# Patient Record
Sex: FEMALE | HISPANIC OR LATINO | URBAN - METROPOLITAN AREA
[De-identification: names, ages, dates, MRNs, and addresses within clinical notes are randomized per-mention and may not be internally consistent; named-entity substitution may affect disease eponyms.]

---

## 2018-07-30 ENCOUNTER — OFFICE VISIT (OUTPATIENT)
Dept: URBAN - METROPOLITAN AREA CLINIC 17 | Facility: CLINIC | Age: 38
End: 2018-07-30
Payer: COMMERCIAL

## 2018-07-30 PROCEDURE — 92004 COMPRE OPH EXAM NEW PT 1/>: CPT | Performed by: OPTOMETRIST

## 2018-07-30 RX ORDER — PREDNISOLONE ACETATE 10 MG/ML
1 % SUSPENSION/ DROPS OPHTHALMIC
Qty: 1 | Refills: 0 | Status: INACTIVE
Start: 2018-07-30 | End: 2018-08-07

## 2018-07-30 ASSESSMENT — INTRAOCULAR PRESSURE
OD: 19
OS: 18

## 2018-07-30 NOTE — IMPRESSION/PLAN
Impression: Primary iridocyclitis, bilateral: H20.013. Plan: Discussed diagnosis in detail with patient. Discussed treatment options with patient. Patient is to start Pred ace 1 gtt Q2hrs x 2 days then QID x 5 days OU (while awake). Erx'd today. Patient is to not wear CTL while taking drops. If condition does not improve in 1 week then pt is advised to see PCP.

## 2018-08-07 ENCOUNTER — OFFICE VISIT (OUTPATIENT)
Dept: URBAN - METROPOLITAN AREA CLINIC 17 | Facility: CLINIC | Age: 38
End: 2018-08-07
Payer: COMMERCIAL

## 2018-08-07 DIAGNOSIS — H20.013 PRIMARY IRIDOCYCLITIS, BILATERAL: Primary | ICD-10-CM

## 2018-08-07 PROCEDURE — 92012 INTRM OPH EXAM EST PATIENT: CPT | Performed by: OPTOMETRIST

## 2018-08-07 ASSESSMENT — INTRAOCULAR PRESSURE
OD: 10
OS: 12

## 2018-08-07 NOTE — IMPRESSION/PLAN
Impression: Primary iridocyclitis, bilateral: H20.013. Plan: Discussed diagnosis in detail with patient. Patient is okay to d/c off drops, if pt symptoms return she may call office.  Pt has seen PCP and did blood work( Pending results) Pt is allowed to wear CTL again